# Patient Record
Sex: FEMALE | Race: WHITE | NOT HISPANIC OR LATINO
[De-identification: names, ages, dates, MRNs, and addresses within clinical notes are randomized per-mention and may not be internally consistent; named-entity substitution may affect disease eponyms.]

---

## 2017-09-17 ENCOUNTER — HEALTH MAINTENANCE LETTER (OUTPATIENT)
Age: 62
End: 2017-09-17

## 2019-11-08 ENCOUNTER — HEALTH MAINTENANCE LETTER (OUTPATIENT)
Age: 64
End: 2019-11-08

## 2020-02-23 ENCOUNTER — HEALTH MAINTENANCE LETTER (OUTPATIENT)
Age: 65
End: 2020-02-23

## 2020-04-21 ENCOUNTER — RECORDS - HEALTHEAST (OUTPATIENT)
Dept: ADMINISTRATIVE | Facility: OTHER | Age: 65
End: 2020-04-21

## 2020-04-21 ENCOUNTER — RECORDS - HEALTHEAST (OUTPATIENT)
Dept: LAB | Facility: CLINIC | Age: 65
End: 2020-04-21

## 2020-04-21 LAB
ALBUMIN SERPL-MCNC: 4.2 G/DL (ref 3.5–5)
ALP SERPL-CCNC: 80 U/L (ref 45–120)
ALT SERPL W P-5'-P-CCNC: 36 U/L (ref 0–45)
ANION GAP SERPL CALCULATED.3IONS-SCNC: 8 MMOL/L (ref 5–18)
AST SERPL W P-5'-P-CCNC: 34 U/L (ref 0–40)
BILIRUB SERPL-MCNC: 0.4 MG/DL (ref 0–1)
BUN SERPL-MCNC: 9 MG/DL (ref 8–22)
C REACTIVE PROTEIN LHE: 0.3 MG/DL (ref 0–0.8)
CALCIUM SERPL-MCNC: 9.5 MG/DL (ref 8.5–10.5)
CHLORIDE BLD-SCNC: 101 MMOL/L (ref 98–107)
CO2 SERPL-SCNC: 24 MMOL/L (ref 22–31)
CREAT SERPL-MCNC: 0.72 MG/DL (ref 0.6–1.1)
ERYTHROCYTE [SEDIMENTATION RATE] IN BLOOD BY WESTERGREN METHOD: 16 MM/HR (ref 0–20)
GFR SERPL CREATININE-BSD FRML MDRD: >60 ML/MIN/1.73M2
GLUCOSE BLD-MCNC: 86 MG/DL (ref 70–125)
POTASSIUM BLD-SCNC: 4.2 MMOL/L (ref 3.5–5)
PROT SERPL-MCNC: 7.7 G/DL (ref 6–8)
SODIUM SERPL-SCNC: 133 MMOL/L (ref 136–145)
TSH SERPL DL<=0.005 MIU/L-ACNC: 0.9 UIU/ML (ref 0.3–5)

## 2020-04-22 ENCOUNTER — COMMUNICATION - HEALTHEAST (OUTPATIENT)
Dept: PULMONOLOGY | Facility: OTHER | Age: 65
End: 2020-04-22

## 2020-04-22 LAB — BACTERIA SPEC CULT: NORMAL

## 2020-04-24 ENCOUNTER — COMMUNICATION - HEALTHEAST (OUTPATIENT)
Dept: PULMONOLOGY | Facility: OTHER | Age: 65
End: 2020-04-24

## 2020-04-29 ENCOUNTER — OFFICE VISIT - HEALTHEAST (OUTPATIENT)
Dept: PULMONOLOGY | Facility: OTHER | Age: 65
End: 2020-04-29

## 2020-04-29 DIAGNOSIS — J47.9 BRONCHIECTASIS WITHOUT ACUTE EXACERBATION (H): ICD-10-CM

## 2020-04-29 DIAGNOSIS — R06.09 DOE (DYSPNEA ON EXERTION): ICD-10-CM

## 2020-04-29 ASSESSMENT — MIFFLIN-ST. JEOR: SCORE: 1163.24

## 2020-04-30 ENCOUNTER — RECORDS - HEALTHEAST (OUTPATIENT)
Dept: RADIOLOGY | Facility: CLINIC | Age: 65
End: 2020-04-30

## 2020-04-30 ENCOUNTER — AMBULATORY - HEALTHEAST (OUTPATIENT)
Dept: PULMONOLOGY | Facility: OTHER | Age: 65
End: 2020-04-30

## 2020-05-06 ENCOUNTER — AMBULATORY - HEALTHEAST (OUTPATIENT)
Dept: LAB | Facility: HOSPITAL | Age: 65
End: 2020-05-06

## 2020-05-06 ENCOUNTER — HOSPITAL ENCOUNTER (OUTPATIENT)
Dept: CT IMAGING | Facility: HOSPITAL | Age: 65
Discharge: HOME | End: 2020-05-06
Attending: INTERNAL MEDICINE

## 2020-05-06 DIAGNOSIS — J47.9 BRONCHIECTASIS WITHOUT ACUTE EXACERBATION (H): ICD-10-CM

## 2020-05-06 LAB
BASOPHILS # BLD AUTO: 0 THOU/UL (ref 0–0.2)
BASOPHILS NFR BLD AUTO: 1 % (ref 0–2)
C REACTIVE PROTEIN LHE: 0.8 MG/DL (ref 0–0.8)
EOSINOPHIL # BLD AUTO: 0.1 THOU/UL (ref 0–0.4)
EOSINOPHIL NFR BLD AUTO: 2 % (ref 0–6)
ERYTHROCYTE [DISTWIDTH] IN BLOOD BY AUTOMATED COUNT: 12.4 % (ref 11–14.5)
ERYTHROCYTE [SEDIMENTATION RATE] IN BLOOD BY WESTERGREN METHOD: 19 MM/HR (ref 0–20)
HCT VFR BLD AUTO: 37.8 % (ref 35–47)
HGB BLD-MCNC: 13 G/DL (ref 12–16)
HIV 1+2 AB+HIV1 P24 AG SERPL QL IA: NEGATIVE
IGA SERPL-MCNC: 128 MG/DL (ref 65–400)
IGA SERPL-MCNC: 1818 MG/DL
IGM SERPL-MCNC: 111 MG/DL (ref 60–280)
LYMPHOCYTES # BLD AUTO: 0.9 THOU/UL (ref 0.8–4.4)
LYMPHOCYTES NFR BLD AUTO: 19 % (ref 20–40)
MCH RBC QN AUTO: 30.5 PG (ref 27–34)
MCHC RBC AUTO-ENTMCNC: 34.4 G/DL (ref 32–36)
MCV RBC AUTO: 89 FL (ref 80–100)
MONOCYTES # BLD AUTO: 0.3 THOU/UL (ref 0–0.9)
MONOCYTES NFR BLD AUTO: 6 % (ref 2–10)
NEUTROPHILS # BLD AUTO: 3.2 THOU/UL (ref 2–7.7)
NEUTROPHILS NFR BLD AUTO: 72 % (ref 50–70)
PLATELET # BLD AUTO: 351 THOU/UL (ref 140–440)
PMV BLD AUTO: 9.1 FL (ref 8.5–12.5)
RBC # BLD AUTO: 4.26 MILL/UL (ref 3.8–5.4)
RHEUMATOID FACT SERPL-ACNC: 274.6 IU/ML (ref 0–30)
WBC: 4.4 THOU/UL (ref 4–11)

## 2020-05-07 LAB — CCP AB SER IA-ACNC: <0.5 U/ML

## 2020-05-08 ENCOUNTER — COMMUNICATION - HEALTHEAST (OUTPATIENT)
Dept: PULMONOLOGY | Facility: OTHER | Age: 65
End: 2020-05-08

## 2020-05-08 LAB
GAMMA INTERFERON BACKGROUND BLD IA-ACNC: 0.05 IU/ML
IGG SERPL-MCNC: 1727 MG/DL (ref 610–1616)
IGG1 SER-MCNC: 1373 MG/DL (ref 382–929)
IGG2 SER-MCNC: 227 MG/DL (ref 242–700)
IGG3 SER-MCNC: 121 MG/DL (ref 22–176)
IGG4 SER-MCNC: 2 MG/DL (ref 4–86)
M TB IFN-G BLD-IMP: NEGATIVE
MITOGEN IGNF BCKGRD COR BLD-ACNC: -0.01 IU/ML
MITOGEN IGNF BCKGRD COR BLD-ACNC: -0.01 IU/ML
QTF INTERPRETATION: NORMAL
QTF MITOGEN - NIL: 6.3 IU/ML

## 2020-05-10 ENCOUNTER — AMBULATORY - HEALTHEAST (OUTPATIENT)
Dept: INTENSIVE CARE | Facility: CLINIC | Age: 65
End: 2020-05-10

## 2020-05-10 ENCOUNTER — COMMUNICATION - HEALTHEAST (OUTPATIENT)
Dept: PULMONOLOGY | Facility: OTHER | Age: 65
End: 2020-05-10

## 2020-05-10 DIAGNOSIS — D80.3 IGG4 SUBCLASS DEFICIENCY (H): ICD-10-CM

## 2020-05-10 LAB — TOTAL IGE - HISTORICAL: <2 KU/L (ref 0–100)

## 2020-05-11 LAB — ANA SER QL: 1.3 U

## 2020-05-12 ENCOUNTER — COMMUNICATION - HEALTHEAST (OUTPATIENT)
Dept: ADMINISTRATIVE | Facility: CLINIC | Age: 65
End: 2020-05-12

## 2020-05-18 ENCOUNTER — OFFICE VISIT - HEALTHEAST (OUTPATIENT)
Dept: ALLERGY | Facility: CLINIC | Age: 65
End: 2020-05-18

## 2020-05-18 DIAGNOSIS — J30.1 SEASONAL ALLERGIC RHINITIS DUE TO POLLEN: ICD-10-CM

## 2020-05-18 DIAGNOSIS — B99.9 RECURRENT INFECTIONS: ICD-10-CM

## 2020-05-18 DIAGNOSIS — J47.9 BRONCHIECTASIS WITHOUT ACUTE EXACERBATION (H): ICD-10-CM

## 2020-05-18 ASSESSMENT — MIFFLIN-ST. JEOR: SCORE: 1163.24

## 2020-05-19 ENCOUNTER — COMMUNICATION - HEALTHEAST (OUTPATIENT)
Dept: ALLERGY | Facility: CLINIC | Age: 65
End: 2020-05-19

## 2020-05-21 ENCOUNTER — RECORDS - HEALTHEAST (OUTPATIENT)
Dept: ADMINISTRATIVE | Facility: OTHER | Age: 65
End: 2020-05-21

## 2020-06-08 ENCOUNTER — COMMUNICATION - HEALTHEAST (OUTPATIENT)
Dept: ALLERGY | Facility: CLINIC | Age: 65
End: 2020-06-08

## 2020-06-11 ENCOUNTER — COMMUNICATION - HEALTHEAST (OUTPATIENT)
Dept: ALLERGY | Facility: CLINIC | Age: 65
End: 2020-06-11

## 2020-06-11 DIAGNOSIS — J30.1 SEASONAL ALLERGIC RHINITIS DUE TO POLLEN: ICD-10-CM

## 2020-07-11 ENCOUNTER — COMMUNICATION - HEALTHEAST (OUTPATIENT)
Dept: ALLERGY | Facility: CLINIC | Age: 65
End: 2020-07-11

## 2020-07-11 DIAGNOSIS — J30.1 SEASONAL ALLERGIC RHINITIS DUE TO POLLEN: ICD-10-CM

## 2020-09-13 ENCOUNTER — COMMUNICATION - HEALTHEAST (OUTPATIENT)
Dept: ALLERGY | Facility: CLINIC | Age: 65
End: 2020-09-13

## 2020-09-13 DIAGNOSIS — J30.1 SEASONAL ALLERGIC RHINITIS DUE TO POLLEN: ICD-10-CM

## 2020-12-06 ENCOUNTER — HEALTH MAINTENANCE LETTER (OUTPATIENT)
Age: 65
End: 2020-12-06

## 2020-12-16 ENCOUNTER — COMMUNICATION - HEALTHEAST (OUTPATIENT)
Dept: ALLERGY | Facility: CLINIC | Age: 65
End: 2020-12-16

## 2020-12-16 DIAGNOSIS — J30.1 SEASONAL ALLERGIC RHINITIS DUE TO POLLEN: ICD-10-CM

## 2021-05-13 ENCOUNTER — AMBULATORY - HEALTHEAST (OUTPATIENT)
Dept: MULTI SPECIALTY CLINIC | Facility: CLINIC | Age: 66
End: 2021-05-13

## 2021-05-13 DIAGNOSIS — J47.9 BRONCHIECTASIS WITHOUT ACUTE EXACERBATION (H): ICD-10-CM

## 2021-06-04 VITALS — HEIGHT: 65 IN | WEIGHT: 135 LBS | BODY MASS INDEX: 22.49 KG/M2

## 2021-06-04 VITALS — BODY MASS INDEX: 22.49 KG/M2 | WEIGHT: 135 LBS | HEIGHT: 65 IN | TEMPERATURE: 98.6 F

## 2021-06-07 NOTE — PROGRESS NOTES
DME Provider: Nakita  Date Faxed: 4/30/20  Ordering Provider: Dr. Alberto  Equipment ordered: nebulizer and spacer

## 2021-06-07 NOTE — PROGRESS NOTES
"Yesy Dao is a 64 y.o. female who is being evaluated via a billable video visit.      The patient has been notified of following:     \"This video visit will be conducted via a call between you and your physician/provider. We have found that certain health care needs can be provided without the need for an in-person physical exam.  This service lets us provide the care you need with a video conversation.  If a prescription is necessary we can send it directly to your pharmacy.  If lab work is needed we can place an order for that and you can then stop by our lab to have the test done at a later time.    Video visits are billed at different rates depending on your insurance coverage. Please reach out to your insurance provider with any questions.    If during the course of the call the physician/provider feels a video visit is not appropriate, you will not be charged for this service.\"    Patient has given verbal consent to a Video visit? Yes    Patient would like to receive their AVS by AVS Preference: Chela.    Patient would like the video invitation sent by: Text to cell phone: [phone]    Will anyone else be joining your video visit? No        Video Start Time: 4:00 PM    Additional provider notes:   Yesy Dao is a 64 year old female with history of cough and bronchiectasis being seen as a video visit for the above.   She has a history of RA diagnosed in 2000, no symptoms, not on immunosuppressants. Previously on MTX but stopped this several years ago.  She moved here from [place] recently wants to establish with a pulmonologist. 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 This was about 8-10 years ago. No AFB organisms such as THOMPSON or lung cancer was found, per the patient.   She has chronic productive cough which has worsened in the last few months. The phlegm light green or dark brown. Occasional hemoptysis.  Never smoker.  She has chronic " shortness of breath, mostly with exertion. Some difficulty climbing one flight stairs.  She was previously followed at the [facility] and had her bronchoscopies and surgery there. I have no records going that fr back.  She apparently had a CXR which was unremarkable recently.   She had been on ICS/LABA before but stopped it due to not having a primary care doctor.    Studies:  Chem panel unremarkable  No CBC  No eos were noted in 2016    2008 cultures, presumably from bronchoscopy or surgery:  Negative for AFB.   No path available.      The most recent CT chest I found was from 2012, only the report is available:  IMPRESSION:    1.  Recurrent multifocal parenchymal airspace disease at both lung bases   associated with bronchiectasis, discrete nodular densities, and ground-glass   opacity.  CT findings are nonspecific.  Infectious etiologies such as fungal   infection need to be considered as well as possibly Wegener granulomatosis.  2.  Splenic artery aneurysm.  3.  Calcified granulomatous right hilar lymph nodes.  4.  Mild, likely reactive lymphadenopathy in the mediastinum.    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 Review of prior imaging reports reveals significant disease burden. She appears to be in fairly good health, though she has chronic shortness of breath with exertion and her cough has worsened recently.     We discussed the pathophysiology of bronchiectasis. Hers could be due to RA with lung manifestations vs. immunodeficiency vs. Chronic infection such as with THOMPSON (she says she was tested for this in the past and did not have evidence of NTM lung disease). She had a bronchoscopy and RML lobectomy in the past but I very few records and no pathology results from these procedures. We'll start with a HRCT and basic lab work to get a sense  of where she stands in terms of lung disease.    Recommendations:  - HRCT   - blood work: CBC w/ diff, ALEXIS, RF, CCP, ESR, CRP, HIV, Ig levels, QTF testing  - start Symbicort high dose 160-4.5 2 puffs two times a day with spacer. Explained rationale for spacer. Rinse/gargle after use. She knows how to use the Symbicort will YouTube proper spacer technique  - duonebs four times a day for pulm hygiene  - consideration for addition of flutter valve, additional pulm hygiene next visit depending on her symptom burden  - encouraged her to exercise and remain active as able  - COVID-19 precautions reinforced  - PFTs once the PFT reopens  - pulmonary rehab in the near future (rehab centers are closed right now)    Follow up in 4-6 weeks for reassessment.  She'll call with any questions/concerns.     Video-Visit Details    Type of service:  Video Visit    Video End Time (time video stopped): 4:28 PM  Originating Location (pt. Location): Home    Distant Location (provider location):  [facility]     Platform used for Video Visit: Matt Wakefield) MD Remy  [facility]  Pager [phone]  Clinic [phone]

## 2021-06-08 NOTE — PROGRESS NOTES
"Yesy Dao is a 64 y.o. female who is being evaluated via a billable video visit.      The patient has been notified of following:     \"This video visit will be conducted via a call between you and your physician/provider. We have found that certain health care needs can be provided without the need for an in-person physical exam.  This service lets us provide the care you need with a video conversation.  If a prescription is necessary we can send it directly to your pharmacy.  If lab work is needed we can place an order for that and you can then stop by our lab to have the test done at a later time.    Video visits are billed at different rates depending on your insurance coverage. Please reach out to your insurance provider with any questions.    If during the course of the call the physician/provider feels a video visit is not appropriate, you will not be charged for this service.\"    Patient has given verbal consent to a Video visit? yes    Patient would like to receive their AVS by [employer]    Patient would like the video invitation sent by: text [phone]    Will anyone else be joining your video visit? no        Video Start Time: 0855    Additional provider notes: Chief complaint: Immunodeficiency    History of present illness: This is a pleasant 64-year-old woman who I was asked to see for evaluation by Dr. Judd in regards to immunodeficiency.  Patient has a history of chronic bronchiectasis.  She has been told in the past that she has had abnormal antibody levels but she is not sure what they were.  In work-up for chronic bronchiectasis, she had her immunoglobulins checked.  IgG was 1818, IgM was 111, IgA was 128.  IgE was undetectable.  IgG subclasses were performed and she was slightly low on IgG2 at 227 and her IgG G4 was 2.  She states that she has recurrent pneumonias.  She has not been hospitalized for pneumonia since 2010.  She states she has difficulty recovering from any colds.  She states " when she gets sick it goes right to her chest.  She has a history of allergies and asthma and notes that her symptoms have worsened since moving back here to [place].  She states she was in [place] for approximately 4 years and then moved back to [place] recently.  She does have a history of rheumatoid arthritis but has not been on immunosuppression for approximately last 2 years.  She is going to see a rheumatologist soon.  She has no history of urinary tract infections or gastrointestinal infections, however, she does have a history of some skin issues.  She states that she is not sure these were infections but has often frequent rashes.  She has had immunizations but her last immunizations on record include tetanus and diphtheria shot in 2011 and a pneumonia vaccine done last in 2005.      Past medical history: Rheumatoid arthritis, she had a blood transfusion in 1982 after the birth of her daughter    Social history:nonsmoker    Family history:  No family history of immunodeficiency    Review of Systems performed as above and the remainder is negative.      Current Outpatient Medications:      budesonide-formoteroL (SYMBICORT) 160-4.5 mcg/actuation inhaler, Inhale 2 puffs 2 (two) times a day., Disp: 1 Inhaler, Rfl: 12     cetirizine 10 mg cap, Take 1 capsule by mouth as needed., Disp: , Rfl:      cholecalciferol, vitamin D3, 1,000 unit (25 mcg) tablet, 1 tablet daily., Disp: , Rfl:      ipratropium-albuteroL (DUO-NEB) 0.5-2.5 mg/3 mL nebulizer, Take 3 mL by nebulization 4 (four) times a day., Disp: 1 vial, Rfl: 6     levothyroxine 75 mcg cap, 1 capsule daily., Disp: , Rfl:      MULTIVITAMIN ORAL, Take 1 tablet by mouth daily., Disp: , Rfl:      omeprazole (PRILOSEC) 20 MG capsule, Take 1 tablet by mouth daily., Disp: , Rfl:      valACYclovir (VALTREX) 500 MG tablet, as needed., Disp: , Rfl:     Allergies   Allergen Reactions     Gluten      Mold Extracts Other (See Comments)     Nasal congestion     Other  "Allergy (See Comments) Other (See Comments)     Grass- nasal congestion       Temp 98.6  F (37  C)   Ht 5' 5\" (1.651 m)   Wt 135 lb (61.2 kg)   BMI 22.47 kg/m    Gen: Pleasant female not in acute distress  HEENT: Eyes no erythema of the bulbar or palpebral conjunctiva, no edema. Nose: No congestion, Mouth: Throat clear, no lip or tongue edema.   Neck: No swelling, full range of motion  Respiratory: No coughing with breathing, no retractions  Lymph: No supraclavicular or cervical lymphadenopathy  Skin: No rashes or lesions  Neuro: Alert and oriented times 3  Psych: Affect appropriate    Impression report and plan:    1.  IgG subclass deficiency  2.  Bronchiectasis    I would like the patient to have titers for tetanus, diphtheria and pneumococcal.  I would like to recheck her IgG levels as well.  Pending this evaluation, she may need to be immunized and have these levels rechecked.  She may be a candidate for IVIG given her bronchiectasis.      3.  Allergic rhinitis    She states that her allergy symptoms have worsened.  She uses antihistamines as needed.  Suggested a trial of montelukast.  Cautioned her to the rare side effect of mood disturbance.  Her IgE level is undetectable, however, so this may be more nonallergic rhinitis.    Video-Visit Details    Type of service:  Video Visit    Video End Time (time video stopped):0925  Originating Location (pt. Location): home    Distant Location (provider location):  [facility]     Platform used for Video Visit:tony"

## 2021-06-08 NOTE — TELEPHONE ENCOUNTER
LM to call back and we can schedule lab appt at [place] for her.    ----- Message from Annalisa Boles MD sent at 5/18/2020  4:03 PM CDT -----  Please help patient schedule lab only appt at [facility]

## 2021-06-16 PROBLEM — J47.9 BRONCHIECTASIS WITHOUT ACUTE EXACERBATION (H): Status: ACTIVE | Noted: 2020-04-29

## 2021-06-16 PROBLEM — R06.09 DOE (DYSPNEA ON EXERTION): Status: ACTIVE | Noted: 2020-04-29

## 2021-07-03 NOTE — ADDENDUM NOTE
Addendum Note by Fady Meehan MLT at 4/29/2020  4:00 PM     Author: Fady Meehan MLT Service: -- Author Type:     Filed: 5/5/2020 11:15 AM Encounter Date: 4/29/2020 Status: Signed    : Fady Meehan MLT ()    Addended by: FADY MEEHAN on: 5/5/2020 11:15 AM        Modules accepted: Orders

## 2021-08-27 ENCOUNTER — LAB REQUISITION (OUTPATIENT)
Dept: LAB | Facility: CLINIC | Age: 66
End: 2021-08-27
Payer: COMMERCIAL

## 2021-08-27 DIAGNOSIS — U07.1 COVID-19: ICD-10-CM

## 2021-08-27 PROCEDURE — U0003 INFECTIOUS AGENT DETECTION BY NUCLEIC ACID (DNA OR RNA); SEVERE ACUTE RESPIRATORY SYNDROME CORONAVIRUS 2 (SARS-COV-2) (CORONAVIRUS DISEASE [COVID-19]), AMPLIFIED PROBE TECHNIQUE, MAKING USE OF HIGH THROUGHPUT TECHNOLOGIES AS DESCRIBED BY CMS-2020-01-R: HCPCS | Mod: ORL | Performed by: INTERNAL MEDICINE

## 2021-08-28 LAB — SARS-COV-2 RNA RESP QL NAA+PROBE: NEGATIVE

## 2021-09-01 PROCEDURE — U0005 INFEC AGEN DETEC AMPLI PROBE: HCPCS | Mod: ORL | Performed by: INTERNAL MEDICINE

## 2021-09-02 ENCOUNTER — LAB REQUISITION (OUTPATIENT)
Dept: LAB | Facility: CLINIC | Age: 66
End: 2021-09-02
Payer: COMMERCIAL

## 2021-09-02 DIAGNOSIS — U07.1 COVID-19: ICD-10-CM

## 2021-09-03 LAB — SARS-COV-2 RNA RESP QL NAA+PROBE: NEGATIVE

## 2021-09-08 ENCOUNTER — LAB REQUISITION (OUTPATIENT)
Dept: LAB | Facility: CLINIC | Age: 66
End: 2021-09-08
Payer: COMMERCIAL

## 2021-09-08 DIAGNOSIS — U07.1 COVID-19: ICD-10-CM

## 2021-09-08 PROCEDURE — U0003 INFECTIOUS AGENT DETECTION BY NUCLEIC ACID (DNA OR RNA); SEVERE ACUTE RESPIRATORY SYNDROME CORONAVIRUS 2 (SARS-COV-2) (CORONAVIRUS DISEASE [COVID-19]), AMPLIFIED PROBE TECHNIQUE, MAKING USE OF HIGH THROUGHPUT TECHNOLOGIES AS DESCRIBED BY CMS-2020-01-R: HCPCS | Mod: ORL | Performed by: INTERNAL MEDICINE

## 2021-09-09 LAB — SARS-COV-2 RNA RESP QL NAA+PROBE: NEGATIVE

## 2021-09-15 ENCOUNTER — LAB REQUISITION (OUTPATIENT)
Dept: LAB | Facility: CLINIC | Age: 66
End: 2021-09-15
Payer: COMMERCIAL

## 2021-09-15 DIAGNOSIS — U07.1 COVID-19: ICD-10-CM

## 2021-09-15 PROCEDURE — U0003 INFECTIOUS AGENT DETECTION BY NUCLEIC ACID (DNA OR RNA); SEVERE ACUTE RESPIRATORY SYNDROME CORONAVIRUS 2 (SARS-COV-2) (CORONAVIRUS DISEASE [COVID-19]), AMPLIFIED PROBE TECHNIQUE, MAKING USE OF HIGH THROUGHPUT TECHNOLOGIES AS DESCRIBED BY CMS-2020-01-R: HCPCS | Mod: ORL | Performed by: INTERNAL MEDICINE

## 2021-09-19 LAB — SARS-COV-2 RNA RESP QL NAA+PROBE: NOT DETECTED

## 2021-09-26 ENCOUNTER — HEALTH MAINTENANCE LETTER (OUTPATIENT)
Age: 66
End: 2021-09-26

## 2021-10-07 PROCEDURE — U0003 INFECTIOUS AGENT DETECTION BY NUCLEIC ACID (DNA OR RNA); SEVERE ACUTE RESPIRATORY SYNDROME CORONAVIRUS 2 (SARS-COV-2) (CORONAVIRUS DISEASE [COVID-19]), AMPLIFIED PROBE TECHNIQUE, MAKING USE OF HIGH THROUGHPUT TECHNOLOGIES AS DESCRIBED BY CMS-2020-01-R: HCPCS | Performed by: INTERNAL MEDICINE

## 2021-10-08 ENCOUNTER — LAB REQUISITION (OUTPATIENT)
Dept: LAB | Facility: CLINIC | Age: 66
End: 2021-10-08

## 2021-10-08 DIAGNOSIS — U07.1 COVID-19: ICD-10-CM

## 2021-10-10 LAB — SARS-COV-2 RNA RESP QL NAA+PROBE: NOT DETECTED

## 2021-10-13 PROCEDURE — U0003 INFECTIOUS AGENT DETECTION BY NUCLEIC ACID (DNA OR RNA); SEVERE ACUTE RESPIRATORY SYNDROME CORONAVIRUS 2 (SARS-COV-2) (CORONAVIRUS DISEASE [COVID-19]), AMPLIFIED PROBE TECHNIQUE, MAKING USE OF HIGH THROUGHPUT TECHNOLOGIES AS DESCRIBED BY CMS-2020-01-R: HCPCS | Mod: ORL | Performed by: INTERNAL MEDICINE

## 2021-10-14 ENCOUNTER — LAB REQUISITION (OUTPATIENT)
Dept: LAB | Facility: CLINIC | Age: 66
End: 2021-10-14
Payer: COMMERCIAL

## 2021-10-14 DIAGNOSIS — U07.1 COVID-19: ICD-10-CM

## 2021-10-16 LAB — SARS-COV-2 RNA RESP QL NAA+PROBE: NOT DETECTED

## 2021-10-21 PROCEDURE — U0003 INFECTIOUS AGENT DETECTION BY NUCLEIC ACID (DNA OR RNA); SEVERE ACUTE RESPIRATORY SYNDROME CORONAVIRUS 2 (SARS-COV-2) (CORONAVIRUS DISEASE [COVID-19]), AMPLIFIED PROBE TECHNIQUE, MAKING USE OF HIGH THROUGHPUT TECHNOLOGIES AS DESCRIBED BY CMS-2020-01-R: HCPCS | Performed by: INTERNAL MEDICINE

## 2021-10-22 ENCOUNTER — LAB REQUISITION (OUTPATIENT)
Dept: LAB | Facility: CLINIC | Age: 66
End: 2021-10-22

## 2021-10-22 DIAGNOSIS — U07.1 COVID-19: ICD-10-CM

## 2021-10-24 LAB — SARS-COV-2 RNA RESP QL NAA+PROBE: NOT DETECTED

## 2021-11-04 PROCEDURE — U0003 INFECTIOUS AGENT DETECTION BY NUCLEIC ACID (DNA OR RNA); SEVERE ACUTE RESPIRATORY SYNDROME CORONAVIRUS 2 (SARS-COV-2) (CORONAVIRUS DISEASE [COVID-19]), AMPLIFIED PROBE TECHNIQUE, MAKING USE OF HIGH THROUGHPUT TECHNOLOGIES AS DESCRIBED BY CMS-2020-01-R: HCPCS | Mod: ORL | Performed by: INTERNAL MEDICINE

## 2021-11-05 ENCOUNTER — LAB REQUISITION (OUTPATIENT)
Dept: LAB | Facility: CLINIC | Age: 66
End: 2021-11-05
Payer: COMMERCIAL

## 2021-11-05 DIAGNOSIS — U07.1 COVID-19: ICD-10-CM

## 2021-11-06 LAB — SARS-COV-2 RNA RESP QL NAA+PROBE: NEGATIVE

## 2021-11-10 PROCEDURE — U0003 INFECTIOUS AGENT DETECTION BY NUCLEIC ACID (DNA OR RNA); SEVERE ACUTE RESPIRATORY SYNDROME CORONAVIRUS 2 (SARS-COV-2) (CORONAVIRUS DISEASE [COVID-19]), AMPLIFIED PROBE TECHNIQUE, MAKING USE OF HIGH THROUGHPUT TECHNOLOGIES AS DESCRIBED BY CMS-2020-01-R: HCPCS | Mod: ORL | Performed by: INTERNAL MEDICINE

## 2021-11-11 ENCOUNTER — LAB REQUISITION (OUTPATIENT)
Dept: LAB | Facility: CLINIC | Age: 66
End: 2021-11-11
Payer: COMMERCIAL

## 2021-11-11 DIAGNOSIS — U07.1 COVID-19: ICD-10-CM

## 2021-11-11 LAB — SARS-COV-2 RNA RESP QL NAA+PROBE: NEGATIVE

## 2021-11-18 PROCEDURE — U0003 INFECTIOUS AGENT DETECTION BY NUCLEIC ACID (DNA OR RNA); SEVERE ACUTE RESPIRATORY SYNDROME CORONAVIRUS 2 (SARS-COV-2) (CORONAVIRUS DISEASE [COVID-19]), AMPLIFIED PROBE TECHNIQUE, MAKING USE OF HIGH THROUGHPUT TECHNOLOGIES AS DESCRIBED BY CMS-2020-01-R: HCPCS | Mod: ORL | Performed by: INTERNAL MEDICINE

## 2021-11-19 ENCOUNTER — LAB REQUISITION (OUTPATIENT)
Dept: LAB | Facility: CLINIC | Age: 66
End: 2021-11-19
Payer: COMMERCIAL

## 2021-11-19 DIAGNOSIS — U07.1 COVID-19: ICD-10-CM

## 2021-11-19 LAB — SARS-COV-2 RNA RESP QL NAA+PROBE: NEGATIVE

## 2021-11-20 ENCOUNTER — HEALTH MAINTENANCE LETTER (OUTPATIENT)
Age: 66
End: 2021-11-20

## 2021-11-23 PROCEDURE — U0003 INFECTIOUS AGENT DETECTION BY NUCLEIC ACID (DNA OR RNA); SEVERE ACUTE RESPIRATORY SYNDROME CORONAVIRUS 2 (SARS-COV-2) (CORONAVIRUS DISEASE [COVID-19]), AMPLIFIED PROBE TECHNIQUE, MAKING USE OF HIGH THROUGHPUT TECHNOLOGIES AS DESCRIBED BY CMS-2020-01-R: HCPCS | Performed by: INTERNAL MEDICINE

## 2021-11-24 ENCOUNTER — LAB REQUISITION (OUTPATIENT)
Dept: LAB | Facility: CLINIC | Age: 66
End: 2021-11-24
Payer: COMMERCIAL

## 2021-11-24 DIAGNOSIS — Z03.818 ENCOUNTER FOR OBSERVATION FOR SUSPECTED EXPOSURE TO OTHER BIOLOGICAL AGENTS RULED OUT: ICD-10-CM

## 2021-11-24 LAB — SARS-COV-2 RNA RESP QL NAA+PROBE: NEGATIVE

## 2022-01-05 PROCEDURE — U0003 INFECTIOUS AGENT DETECTION BY NUCLEIC ACID (DNA OR RNA); SEVERE ACUTE RESPIRATORY SYNDROME CORONAVIRUS 2 (SARS-COV-2) (CORONAVIRUS DISEASE [COVID-19]), AMPLIFIED PROBE TECHNIQUE, MAKING USE OF HIGH THROUGHPUT TECHNOLOGIES AS DESCRIBED BY CMS-2020-01-R: HCPCS | Mod: ORL | Performed by: INTERNAL MEDICINE

## 2022-01-13 ENCOUNTER — LAB REQUISITION (OUTPATIENT)
Dept: LAB | Facility: CLINIC | Age: 67
End: 2022-01-13
Payer: COMMERCIAL

## 2022-01-13 DIAGNOSIS — Z03.818 ENCOUNTER FOR OBSERVATION FOR SUSPECTED EXPOSURE TO OTHER BIOLOGICAL AGENTS RULED OUT: ICD-10-CM

## 2022-01-13 PROCEDURE — U0005 INFEC AGEN DETEC AMPLI PROBE: HCPCS | Mod: ORL | Performed by: INTERNAL MEDICINE

## 2022-01-14 LAB — SARS-COV-2 RNA RESP QL NAA+PROBE: NEGATIVE

## 2022-01-15 ENCOUNTER — HEALTH MAINTENANCE LETTER (OUTPATIENT)
Age: 67
End: 2022-01-15

## 2022-01-20 PROCEDURE — U0005 INFEC AGEN DETEC AMPLI PROBE: HCPCS | Mod: ORL | Performed by: INTERNAL MEDICINE

## 2022-01-22 ENCOUNTER — LAB REQUISITION (OUTPATIENT)
Dept: LAB | Facility: CLINIC | Age: 67
End: 2022-01-22
Payer: COMMERCIAL

## 2022-01-22 DIAGNOSIS — U07.1 COVID-19: ICD-10-CM

## 2022-01-22 LAB — SARS-COV-2 RNA RESP QL NAA+PROBE: NEGATIVE

## 2022-01-26 PROCEDURE — U0005 INFEC AGEN DETEC AMPLI PROBE: HCPCS | Mod: ORL | Performed by: INTERNAL MEDICINE

## 2022-01-27 ENCOUNTER — LAB REQUISITION (OUTPATIENT)
Dept: LAB | Facility: CLINIC | Age: 67
End: 2022-01-27
Payer: COMMERCIAL

## 2022-01-27 DIAGNOSIS — U07.1 COVID-19: ICD-10-CM

## 2022-01-28 LAB — SARS-COV-2 RNA RESP QL NAA+PROBE: NEGATIVE

## 2022-02-09 ENCOUNTER — LAB REQUISITION (OUTPATIENT)
Dept: LAB | Facility: CLINIC | Age: 67
End: 2022-02-09
Payer: COMMERCIAL

## 2022-02-09 DIAGNOSIS — U07.1 COVID-19: ICD-10-CM

## 2022-02-09 PROCEDURE — U0003 INFECTIOUS AGENT DETECTION BY NUCLEIC ACID (DNA OR RNA); SEVERE ACUTE RESPIRATORY SYNDROME CORONAVIRUS 2 (SARS-COV-2) (CORONAVIRUS DISEASE [COVID-19]), AMPLIFIED PROBE TECHNIQUE, MAKING USE OF HIGH THROUGHPUT TECHNOLOGIES AS DESCRIBED BY CMS-2020-01-R: HCPCS | Mod: ORL | Performed by: INTERNAL MEDICINE

## 2022-02-10 LAB — SARS-COV-2 RNA RESP QL NAA+PROBE: NEGATIVE

## 2022-02-16 PROCEDURE — U0003 INFECTIOUS AGENT DETECTION BY NUCLEIC ACID (DNA OR RNA); SEVERE ACUTE RESPIRATORY SYNDROME CORONAVIRUS 2 (SARS-COV-2) (CORONAVIRUS DISEASE [COVID-19]), AMPLIFIED PROBE TECHNIQUE, MAKING USE OF HIGH THROUGHPUT TECHNOLOGIES AS DESCRIBED BY CMS-2020-01-R: HCPCS | Mod: ORL | Performed by: INTERNAL MEDICINE

## 2022-02-17 ENCOUNTER — LAB REQUISITION (OUTPATIENT)
Dept: LAB | Facility: CLINIC | Age: 67
End: 2022-02-17
Payer: COMMERCIAL

## 2022-02-17 LAB — SARS-COV-2 RNA RESP QL NAA+PROBE: NEGATIVE

## 2022-05-10 ENCOUNTER — LAB REQUISITION (OUTPATIENT)
Dept: LAB | Facility: CLINIC | Age: 67
End: 2022-05-10
Payer: COMMERCIAL

## 2022-05-10 DIAGNOSIS — Z03.818 ENCOUNTER FOR OBSERVATION FOR SUSPECTED EXPOSURE TO OTHER BIOLOGICAL AGENTS RULED OUT: ICD-10-CM

## 2022-05-10 PROCEDURE — U0005 INFEC AGEN DETEC AMPLI PROBE: HCPCS | Mod: ORL | Performed by: INTERNAL MEDICINE

## 2022-05-11 LAB — SARS-COV-2 RNA RESP QL NAA+PROBE: NEGATIVE

## 2022-05-19 PROCEDURE — U0003 INFECTIOUS AGENT DETECTION BY NUCLEIC ACID (DNA OR RNA); SEVERE ACUTE RESPIRATORY SYNDROME CORONAVIRUS 2 (SARS-COV-2) (CORONAVIRUS DISEASE [COVID-19]), AMPLIFIED PROBE TECHNIQUE, MAKING USE OF HIGH THROUGHPUT TECHNOLOGIES AS DESCRIBED BY CMS-2020-01-R: HCPCS | Mod: ORL | Performed by: INTERNAL MEDICINE

## 2022-05-20 ENCOUNTER — LAB REQUISITION (OUTPATIENT)
Dept: LAB | Facility: CLINIC | Age: 67
End: 2022-05-20
Payer: COMMERCIAL

## 2022-05-20 DIAGNOSIS — Z03.818 ENCOUNTER FOR OBSERVATION FOR SUSPECTED EXPOSURE TO OTHER BIOLOGICAL AGENTS RULED OUT: ICD-10-CM

## 2022-05-21 LAB — SARS-COV-2 RNA RESP QL NAA+PROBE: NEGATIVE

## 2022-05-25 ENCOUNTER — LAB REQUISITION (OUTPATIENT)
Dept: LAB | Facility: CLINIC | Age: 67
End: 2022-05-25
Payer: COMMERCIAL

## 2022-05-25 DIAGNOSIS — Z03.818 ENCOUNTER FOR OBSERVATION FOR SUSPECTED EXPOSURE TO OTHER BIOLOGICAL AGENTS RULED OUT: ICD-10-CM

## 2022-05-25 PROCEDURE — U0005 INFEC AGEN DETEC AMPLI PROBE: HCPCS | Mod: ORL | Performed by: INTERNAL MEDICINE

## 2022-05-26 LAB — SARS-COV-2 RNA RESP QL NAA+PROBE: NEGATIVE

## 2022-06-02 ENCOUNTER — LAB REQUISITION (OUTPATIENT)
Dept: LAB | Facility: CLINIC | Age: 67
End: 2022-06-02
Payer: COMMERCIAL

## 2022-06-02 DIAGNOSIS — Z03.818 ENCOUNTER FOR OBSERVATION FOR SUSPECTED EXPOSURE TO OTHER BIOLOGICAL AGENTS RULED OUT: ICD-10-CM

## 2022-06-02 PROCEDURE — U0005 INFEC AGEN DETEC AMPLI PROBE: HCPCS | Mod: ORL | Performed by: INTERNAL MEDICINE

## 2022-06-03 LAB — SARS-COV-2 RNA RESP QL NAA+PROBE: POSITIVE

## 2022-09-14 ENCOUNTER — LAB REQUISITION (OUTPATIENT)
Dept: LAB | Facility: CLINIC | Age: 67
End: 2022-09-14
Payer: COMMERCIAL

## 2022-09-14 DIAGNOSIS — U07.1 COVID-19: ICD-10-CM

## 2022-09-14 PROCEDURE — U0003 INFECTIOUS AGENT DETECTION BY NUCLEIC ACID (DNA OR RNA); SEVERE ACUTE RESPIRATORY SYNDROME CORONAVIRUS 2 (SARS-COV-2) (CORONAVIRUS DISEASE [COVID-19]), AMPLIFIED PROBE TECHNIQUE, MAKING USE OF HIGH THROUGHPUT TECHNOLOGIES AS DESCRIBED BY CMS-2020-01-R: HCPCS | Mod: ORL | Performed by: INTERNAL MEDICINE

## 2022-09-15 LAB — SARS-COV-2 RNA RESP QL NAA+PROBE: NEGATIVE

## 2022-09-21 PROCEDURE — U0005 INFEC AGEN DETEC AMPLI PROBE: HCPCS | Mod: ORL | Performed by: INTERNAL MEDICINE

## 2022-09-28 ENCOUNTER — LAB REQUISITION (OUTPATIENT)
Dept: LAB | Facility: CLINIC | Age: 67
End: 2022-09-28
Payer: COMMERCIAL

## 2022-09-28 DIAGNOSIS — U07.1 COVID-19: ICD-10-CM

## 2022-09-28 PROCEDURE — U0005 INFEC AGEN DETEC AMPLI PROBE: HCPCS | Mod: ORL | Performed by: INTERNAL MEDICINE

## 2022-09-29 LAB — SARS-COV-2 RNA RESP QL NAA+PROBE: NEGATIVE

## 2022-10-06 ENCOUNTER — LAB REQUISITION (OUTPATIENT)
Dept: LAB | Facility: CLINIC | Age: 67
End: 2022-10-06
Payer: COMMERCIAL

## 2022-10-06 DIAGNOSIS — U07.1 COVID-19: ICD-10-CM

## 2022-10-06 LAB — SARS-COV-2 RNA RESP QL NAA+PROBE: NEGATIVE

## 2022-10-06 PROCEDURE — U0005 INFEC AGEN DETEC AMPLI PROBE: HCPCS | Mod: ORL | Performed by: INTERNAL MEDICINE

## 2023-04-22 ENCOUNTER — HEALTH MAINTENANCE LETTER (OUTPATIENT)
Age: 68
End: 2023-04-22

## 2023-12-02 ENCOUNTER — HEALTH MAINTENANCE LETTER (OUTPATIENT)
Age: 68
End: 2023-12-02